# Patient Record
Sex: MALE
[De-identification: names, ages, dates, MRNs, and addresses within clinical notes are randomized per-mention and may not be internally consistent; named-entity substitution may affect disease eponyms.]

---

## 2019-12-04 PROBLEM — Z00.00 ENCOUNTER FOR PREVENTIVE HEALTH EXAMINATION: Status: ACTIVE | Noted: 2019-12-04

## 2019-12-10 ENCOUNTER — APPOINTMENT (OUTPATIENT)
Dept: ORTHOPEDIC SURGERY | Facility: CLINIC | Age: 74
End: 2019-12-10
Payer: MEDICARE

## 2019-12-10 VITALS
HEIGHT: 71 IN | SYSTOLIC BLOOD PRESSURE: 165 MMHG | WEIGHT: 204 LBS | DIASTOLIC BLOOD PRESSURE: 91 MMHG | HEART RATE: 80 BPM | BODY MASS INDEX: 28.56 KG/M2

## 2019-12-10 DIAGNOSIS — M70.61 TROCHANTERIC BURSITIS, RIGHT HIP: ICD-10-CM

## 2019-12-10 PROCEDURE — 99203 OFFICE O/P NEW LOW 30 MIN: CPT | Mod: 25

## 2019-12-10 PROCEDURE — 73522 X-RAY EXAM HIPS BI 3-4 VIEWS: CPT

## 2019-12-10 PROCEDURE — 20610 DRAIN/INJ JOINT/BURSA W/O US: CPT | Mod: RT

## 2019-12-10 NOTE — PHYSICAL EXAM
[de-identified] : Constitutional - the patient is of normal build and nutrition.  The patient remains oriented to person, time, and  place.  Mood is normal. Vital signs as recorded.  The patients gait is WNL.  He is tender over his greater trochanter on the right.  This clinically appears to be consistent with trochanteric bursitis.  The patient has satisfactory  balance and can stand on toes and heels.\par \par The patient has no difficulty with respiration. Respiration at rest is a normal rate. The patient is not short of breath and has not become short of breath with short ambulation. There is no audible wheezing. No coughing.\par \par Skin is normal for the patient's age. There are no abnormal masses or lymph nodes which stand out in the lower extremities.\par \par Spine - deep tendon reflexes are symmetric.  The type of pain he is having appears to be more trochanteric bursitis but he could have some radicular pain from his back also causing this discomfort.\par \par He is now getting over a gout and is left foot.\par \par \par UPPER EXTREMITIES \par \par Shoulders ROM  is symmetric  and the motion is satisfactory.  There is no significant shoulder pain or limitation in motion which would make using a cane or a walker difficult. Shoulder stability and  strength are satisfactory.\par \par Circulation appears satisfactory with pedal pulses present.  There is no major edema in the lower legs. No skin tenderness or increased temperature. No major varicosities.\par \par HIP EXAMINATION the abduction and abduction as well as rotation measurements were taken with the hip in flexion.\par \par Motion\par Hip flexion                               *[Right 135   Left 135]\par Hip abduction                         *[Right 70      Left 70]\par Hip adduction                         *[Right 35     Left 35]\par Hip external  Rotation             *[Right 65     Left 65]\par Hip Internal Rotation              *[Right 20      Left 20]\par Hip Extension                         *[Right 0        Left 0]\par \par The hips have good range of motion. There is good strength across the hips. There is no crepitus in either hip. The alignment of the hips are normal.  Exam is generally within normal limits.  He has excellent motion and the pain appears to be more consistent with trochanteric bursitis of his right hip.\par \par \par KNEE EXAMINATION\par \par Motion\par Right Knee has 0 to 135 degrees of motion with good medial  lateral and anterior posterior stability.  There is no major effusion.  There is no Baker's cyst.  There is no significant patellofemoral crepitus.  The patient has satisfactory strength across the knee.               \par Left  Knee   has 0 to 135 degrees of motion with good medial lateral and anterior posterior stability.  There is no major effusion there is no Baker's cyst.  There is no significant patellofemoral crepitus.  The patient has satisfactory strength across the knee.            \par \par Ankle and foot examination\par Of the ankle has normal motion.  There is normal ankle stability.  The patient has no major abnormalities of the foot.\par \par \par \par  [de-identified] : AP of the pelvis and lateral of his right and left hip shows femoral heads are round joint spaces maintained no significant evidence of arthritis no calcium over the greater trochanter but this does not rule out trochanteric bursitis.  The AP of the pelvis he can be seen that he has some degenerative changes L45.

## 2019-12-10 NOTE — DISCUSSION/SUMMARY
[de-identified] : Tolerated the local injection very well if his pain diminishes or is gone and it is a trochanteric bursitis of her persist he could be getting some radicular pain from his back and that could be further evaluated.

## 2019-12-10 NOTE — CONSULT LETTER
[FreeTextEntry1] : Dear Dr. Sotero Teran\par \par I had the pleasure of evaluating your patient in the office today. I am sending you a copy of my Orthopaedic consult note for your record. If you have any questions, please do not hesitate to contact my office.\par \par Sincerely,\par \par Mandeep Lomeli MD\par Chief, Adult Joint Reconstruction\par [facility]

## 2019-12-10 NOTE — HISTORY OF PRESENT ILLNESS
[de-identified] : Mr. KODY HOGAN is a 74 year male who presents to office complaining of right hip pain.\par Pain has been intermittent for about 20 years but worse insidiously over the last year.\par Pain starts on the lateral aspect of the hip and radiates into the groin. \par Pain is aggravated with physical activity such as getting up from a seated position and ambulating up and down stairs.\par Patient has not performed conservative treated for this pain, other than rest.\par Denies radiating pain down his leg, any lower back pain, distal neuropathy, or radiculopathy.\par Also denies hip clicking, locking, or instability.\par All review of systems, family history, social history, surgical history, past medical history, medications, and allergies not previously stated as positive are negative. They were reviewed by me today with the patient and documented accordingly.

## 2019-12-11 ENCOUNTER — MOBILE ON CALL (OUTPATIENT)
Age: 74
End: 2019-12-11

## 2021-05-08 NOTE — PROCEDURE
[de-identified] : Procedure Note: \par \par Anatomic Location: right  hip trochanteric bursitis\par \par Diagnosis:  hip trochanteric bursitis\par \par Procedure:  Injection of 6ccs of Marcaine 0.5% plain and Depo-Medrol 1cc (40 MG)\par \par Local Spray: Ethyl Chloride.\par \par Skin preparation with alcohol.\par \par Patient has consented for the procedure.\par \par Injection 22-gauge spinal needle  through an anterior  lateral approach.\par \par Patient tolerated the procedure well.\par \par Patient instructed to call the office if any reaction, fever, chills, increased erythema or swelling.   [phone]. Attending Only

## 2021-07-07 ENCOUNTER — APPOINTMENT (OUTPATIENT)
Dept: PULMONOLOGY | Facility: CLINIC | Age: 76
End: 2021-07-07
Payer: MEDICARE

## 2021-07-07 VITALS
HEIGHT: 71 IN | WEIGHT: 202 LBS | SYSTOLIC BLOOD PRESSURE: 155 MMHG | BODY MASS INDEX: 28.28 KG/M2 | DIASTOLIC BLOOD PRESSURE: 98 MMHG | OXYGEN SATURATION: 96 % | TEMPERATURE: 97.4 F | HEART RATE: 86 BPM

## 2021-07-07 PROCEDURE — 94726 PLETHYSMOGRAPHY LUNG VOLUMES: CPT

## 2021-07-07 PROCEDURE — 94729 DIFFUSING CAPACITY: CPT

## 2021-07-07 PROCEDURE — 94010 BREATHING CAPACITY TEST: CPT

## 2021-07-07 PROCEDURE — 99205 OFFICE O/P NEW HI 60 MIN: CPT | Mod: 25

## 2021-07-08 NOTE — PROCEDURE
[FreeTextEntry1] : 2021\par Pulmonary function testing\par These data demonstrate a moderate obstructive ventilatory deficit. Normal Lung Volumes. Airway resistance is normal. There is a moderate diffusion impairment. \par \par CAT scan of the chest of 2021 reviewed at [facility] radiology.\par \par Final Report\par CT CT Chest WO\par \par Show Printer-Friendly Version	\par Patient Name:	Tremaine Suarez\par :	1945\par ID:	[id number](DEFAULT)\par Study Date:	2021 10:29\par CT Chest WO\par  \par CLINICAL INDICATION: Hypertension\par .  Smoking history.\par TECHNIQUE: Multidetector-row CT images of the chest are obtained from \par the thoracic inlet through the upper abdomen without intravenous \par contrast. Coronal and sagittal reconstructions were performed. MIP \par images were reviewed. \par  \par COMPARISON:   No prior.\par  \par FINDINGS:\par 01. LUNGS, AIRWAYS: Trachea and bronchi are patent.\par Mild emphysema, upper lobe predominant.\par Diffuse bronchial wall thickening.\par Diffuse peripheral reticular opacities\par  \par 02. PLEURA: No pleural effusion.\par 03. HEART AND VESSELS: No pericardial effusion.  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  The ascending aorta measures 3.3 cm. \par Coronary artery calcifications.\par 04. MEDIASTINUM, SANDRA, LYMPH NODES: No mediastinal or hilar \par lymphadenopathy.\par 05. UPPER ABDOMEN: No acute abnormality.  Hepatic steatosis. \par Punctate bilateral upper pole renal calculi.\par 06. BONES AND SOFT TISSUES: No suspicious bony lesion.\par 07. LOWER NECK: Unremarkable thyroid.\par  \par IMPRESSION:\par Mild emphysema.\par Diffuse bronchial wall thickening.\par Diffuse peripheral reticular opacities consistent with interstitial \par lung disease.\par  \par Electronically signed by Xochilt Peña MD 2021 11:30 AM \par Ref. Physician: ROLANDA JOHNSON MD\par [address] [place] [address]\[place] [address]\par Signed by: Xochilt Peña    Signed on: 2021 11:33

## 2021-07-08 NOTE — HISTORY OF PRESENT ILLNESS
[Former] : former [TextBox_4] : KODY HOGAN JR is a 75 year old  M referred for pulmonary evaluation for abnormal CAT scan.\par \par Chronic SOB.\par States wheezing for past 30 years. Moderate cough. Positive mucous does not see. \par Family notes pursed lip breathing. MURRAY few blocks. Limited by hip. \par Had episode of acute SOB and trying to cough then had syncope.\par Out briefly.\par Not hospitalized.\par Saw cardiology had cardiac workup.\par Had CT Chest and abdominal sono. \par Had labs told OK except elevated cholesterol and creatinine. Has elevated U.A.\par \par Past pulmonary history. N\par Occupational Exposure.  and . Positive asbestos. Asthma as young adult. \par Family history of pulmonary disease. N\par Recent travel N\par Pets N\par \par Was vaccinated. [TextBox_11] : 1 [TextBox_13] : 60

## 2021-07-08 NOTE — DISCUSSION/SUMMARY
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  Also consideration to occupationally induced disease such as asbestosis.\par Less likely related to other etiologies such as idiopathic pulmonary fibrosis.

## 2021-07-08 NOTE — ASSESSMENT
[FreeTextEntry1] : Trial Stiolto.  Samples and instructed in proper use.\par PRN Beta Agonist.\par Yearly  CT. would recommend sooner if there is a change in respiratory status.\par Smoking cessation.  Urged and discussed.\par Follow-up in 6 months or sooner on a as needed basis.\par

## 2021-07-08 NOTE — PHYSICAL EXAM
[No Acute Distress] : no acute distress [Supple] : supple [No JVD] : no jvd [Normal S1, S2] : normal s1, s2 [No Murmurs] : no murmurs [Normal to Percussion] : normal to percussion [No Abnormalities] : no abnormalities [Benign] : benign [No HSM] : no hsm [No Clubbing] : no clubbing [No Cyanosis] : no cyanosis [No Edema] : no edema [TextBox_68] : Few to 1+ fine basilar crackles.  No active wheezing.

## 2021-07-08 NOTE — CONSULT LETTER
[Dear  ___] : Dear ~KRISTA, [Consult Letter:] : I had the pleasure of evaluating your patient, [unfilled]. [Consult Closing:] : Thank you very much for allowing me to participate in the care of this patient.  If you have any questions, please do not hesitate to contact me. [Sincerely,] : Sincerely, [FreeTextEntry2] : Sotero Teran MD\par  [FreeTextEntry3] : Christophe Onofre MD FCCP\par

## 2021-07-16 ENCOUNTER — APPOINTMENT (OUTPATIENT)
Dept: INTERNAL MEDICINE | Facility: CLINIC | Age: 76
End: 2021-07-16

## 2022-01-12 ENCOUNTER — APPOINTMENT (OUTPATIENT)
Dept: PULMONOLOGY | Facility: CLINIC | Age: 77
End: 2022-01-12

## 2022-02-16 ENCOUNTER — APPOINTMENT (OUTPATIENT)
Dept: PULMONOLOGY | Facility: CLINIC | Age: 77
End: 2022-02-16
Payer: MEDICARE

## 2022-02-16 DIAGNOSIS — R06.02 SHORTNESS OF BREATH: ICD-10-CM

## 2022-02-16 PROCEDURE — 99213 OFFICE O/P EST LOW 20 MIN: CPT

## 2022-02-16 PROCEDURE — ZZZZZ: CPT

## 2022-02-17 NOTE — HISTORY OF PRESENT ILLNESS
[Former] : former [TextBox_4] : has been using Stiolto PRN\par did not have script so was using samples sparingly\par never got albuterol either\par still smoking cut down to less than 1 PPD\par has some cough and wheezing PRN, some mucus prn [TextBox_11] : 1 [TextBox_13] : 60

## 2022-02-17 NOTE — ASSESSMENT
[FreeTextEntry1] : Stiolto continue urge compliance. \par PRN Beta Agonist.\par Yearly  CT. would recommend sooner if there is a change in respiratory status.  Due June.\par Smoking cessation.  Urged and discussed.\par Follow-up in 6 months or sooner on a as needed basis.\par

## 2022-02-17 NOTE — PHYSICAL EXAM
[No Acute Distress] : no acute distress [Supple] : supple [No JVD] : no jvd [Normal S1, S2] : normal s1, s2 [No Murmurs] : no murmurs [Normal to Percussion] : normal to percussion [No Abnormalities] : no abnormalities [Benign] : benign [No HSM] : no hsm [No Clubbing] : no clubbing [No Cyanosis] : no cyanosis [No Edema] : no edema [TextBox_68] : Few fine basilar crackles.  No active wheezing.

## 2022-02-17 NOTE — PROCEDURE
[FreeTextEntry1] : 2021\par Pulmonary function testing\par These data demonstrate a moderate obstructive ventilatory deficit. Normal Lung Volumes. Airway resistance is normal. There is a moderate diffusion impairment. \par \par CAT scan of the chest of 2021 reviewed at [facility] radiology.\par \par Final Report\par CT CT Chest WO\par \par Show Printer-Friendly Version	\par Patient Name:	Tremaine Suarez\par :	1945\par ID:	[id number](DEFAULT)\par Study Date:	2021 10:29\par CT Chest WO\par  \par CLINICAL INDICATION: Hypertension\par .  Smoking history.\par TECHNIQUE: Multidetector-row CT images of the chest are obtained from \par the thoracic inlet through the upper abdomen without intravenous \par contrast. Coronal and sagittal reconstructions were performed. MIP \par images were reviewed. \par  \par COMPARISON:   No prior.\par  \par FINDINGS:\par 01. LUNGS, AIRWAYS: Trachea and bronchi are patent.\par Mild emphysema, upper lobe predominant.\par Diffuse bronchial wall thickening.\par Diffuse peripheral reticular opacities\par  \par 02. PLEURA: No pleural effusion.\par 03. HEART AND VESSELS: No pericardial effusion.  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  The ascending aorta measures 3.3 cm. \par Coronary artery calcifications.\par 04. MEDIASTINUM, SANDRA, LYMPH NODES: No mediastinal or hilar \par lymphadenopathy.\par 05. UPPER ABDOMEN: No acute abnormality.  Hepatic steatosis. \par Punctate bilateral upper pole renal calculi.\par 06. BONES AND SOFT TISSUES: No suspicious bony lesion.\par 07. LOWER NECK: Unremarkable thyroid.\par  \par IMPRESSION:\par Mild emphysema.\par Diffuse bronchial wall thickening.\par Diffuse peripheral reticular opacities consistent with interstitial \par lung disease.\par  \par Electronically signed by Xochilt Peña MD 2021 11:30 AM \par Ref. Physician: ROLANDA JOHNSON MD\par [address] [place] [address]\[place] [address]\par Signed by: Xochilt Peña    Signed on: 2021 11:33

## 2022-07-27 RX ORDER — ALBUTEROL SULFATE 90 UG/1
108 (90 BASE) INHALANT RESPIRATORY (INHALATION)
Qty: 3 | Refills: 3 | Status: ACTIVE | COMMUNITY
Start: 2022-02-16 | End: 1900-01-01

## 2022-09-28 ENCOUNTER — APPOINTMENT (OUTPATIENT)
Dept: PULMONOLOGY | Facility: CLINIC | Age: 77
End: 2022-09-28

## 2024-03-06 ENCOUNTER — APPOINTMENT (OUTPATIENT)
Dept: PULMONOLOGY | Facility: CLINIC | Age: 79
End: 2024-03-06
Payer: MEDICARE

## 2024-03-06 VITALS
SYSTOLIC BLOOD PRESSURE: 155 MMHG | HEART RATE: 86 BPM | BODY MASS INDEX: 28.14 KG/M2 | HEIGHT: 71 IN | TEMPERATURE: 97.5 F | OXYGEN SATURATION: 99 % | DIASTOLIC BLOOD PRESSURE: 63 MMHG | WEIGHT: 201 LBS

## 2024-03-06 DIAGNOSIS — J06.9 ACUTE UPPER RESPIRATORY INFECTION, UNSPECIFIED: ICD-10-CM

## 2024-03-06 DIAGNOSIS — R91.8 OTHER NONSPECIFIC ABNORMAL FINDING OF LUNG FIELD: ICD-10-CM

## 2024-03-06 DIAGNOSIS — F17.200 NICOTINE DEPENDENCE, UNSPECIFIED, UNCOMPLICATED: ICD-10-CM

## 2024-03-06 DIAGNOSIS — J44.9 CHRONIC OBSTRUCTIVE PULMONARY DISEASE, UNSPECIFIED: ICD-10-CM

## 2024-03-06 DIAGNOSIS — R05.9 COUGH, UNSPECIFIED: ICD-10-CM

## 2024-03-06 PROCEDURE — 99214 OFFICE O/P EST MOD 30 MIN: CPT

## 2024-03-06 PROCEDURE — 71046 X-RAY EXAM CHEST 2 VIEWS: CPT

## 2024-03-06 RX ORDER — COLCHICINE 0.6 MG/1
0.6 TABLET ORAL
Refills: 0 | Status: ACTIVE | COMMUNITY

## 2024-03-06 RX ORDER — ALBUTEROL SULFATE 90 UG/1
108 (90 BASE) INHALANT RESPIRATORY (INHALATION)
Qty: 1 | Refills: 5 | Status: DISCONTINUED | COMMUNITY
Start: 2021-07-07 | End: 2024-03-06

## 2024-03-06 RX ORDER — ALLOPURINOL 200 MG/1
TABLET ORAL
Refills: 0 | Status: ACTIVE | COMMUNITY

## 2024-03-06 RX ORDER — ATORVASTATIN CALCIUM 20 MG/1
20 TABLET, FILM COATED ORAL
Refills: 0 | Status: ACTIVE | COMMUNITY

## 2024-03-06 RX ORDER — TIOTROPIUM BROMIDE AND OLODATEROL 3.124; 2.736 UG/1; UG/1
2.5-2.5 SPRAY, METERED RESPIRATORY (INHALATION)
Qty: 1 | Refills: 5 | Status: ACTIVE | COMMUNITY
Start: 2021-12-08 | End: 1900-01-01

## 2024-03-06 RX ORDER — PREDNISONE 10 MG/1
10 TABLET ORAL
Qty: 30 | Refills: 0 | Status: ACTIVE | COMMUNITY
Start: 2024-03-06 | End: 1900-01-01

## 2024-03-06 NOTE — ASSESSMENT
[FreeTextEntry1] : Couse of Prednisone Complete course of Augmentin.  cont Stiolto.  PRN Beta Agonist. Urge to go for CT. CT lung cancer screening. Recommend patient return for reevaluation and lung function testing when improved. Smoking cessation.  Urged and discussed.

## 2024-03-06 NOTE — PROCEDURE
[FreeTextEntry1] :    2021 Pulmonary function testing These data demonstrate a moderate obstructive ventilatory deficit. Normal Lung Volumes. Airway resistance is normal. There is a moderate diffusion impairment.   CAT scan of the chest of 2021 reviewed at [facility] radiology.  Final Report CT CT Chest WO  Show Printer-Friendly Version	 Patient Name:	Tremaine Suarez :	1945 ID:	[id number](DEFAULT) Study Date:	2021 10:29 CT Chest WO   CLINICAL INDICATION: Hypertension .  Smoking history. TECHNIQUE: Multidetector-row CT images of the chest are obtained from  the thoracic inlet through the upper abdomen without intravenous  contrast. Coronal and sagittal reconstructions were performed. MIP  images were reviewed.    COMPARISON:   No prior.   FINDINGS: 01. LUNGS, AIRWAYS: Trachea and bronchi are patent. Mild emphysema, upper lobe predominant. Diffuse bronchial wall thickening. Diffuse peripheral reticular opacities   02. PLEURA: No pleural effusion. 03. HEART AND VESSELS: No pericardial effusion.  There is aberrant  right subclavian artery.  The ascending aorta measures 3.3 cm.  Coronary artery calcifications. 04. MEDIASTINUM, SANDRA, LYMPH NODES: No mediastinal or hilar  lymphadenopathy. 05. UPPER ABDOMEN: No acute abnormality.  Hepatic steatosis.  Punctate bilateral upper pole renal calculi. 06. BONES AND SOFT TISSUES: No suspicious bony lesion. 07. LOWER NECK: Unremarkable thyroid.   IMPRESSION: Mild emphysema. Diffuse bronchial wall thickening. Diffuse peripheral reticular opacities consistent with interstitial  lung disease.   Electronically signed by Xochilt Peña MD 2021 11:30 AM  Ref. Physician: ROLANDA JOHNSON  [address] [place] [address] Signed by: Xochilt Peña    Signed on: 2021 11:33

## 2024-03-06 NOTE — DISCUSSION/SUMMARY
[FreeTextEntry1] : COPD exacerbation. Current every day smoker. Moderate chronic obstructive pulmonary disease. 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  Also consideration to occupationally induced disease such as asbestosis. Less likely related to other etiologies such as idiopathic pulmonary fibrosis.

## 2024-03-06 NOTE — PHYSICAL EXAM
[No Acute Distress] : no acute distress [Supple] : supple [No JVD] : no jvd [Normal S1, S2] : normal s1, s2 [No Murmurs] : no murmurs [Normal to Percussion] : normal to percussion [No Abnormalities] : no abnormalities [Benign] : benign [No HSM] : no hsm [No Clubbing] : no clubbing [No Cyanosis] : no cyanosis [No Edema] : no edema [TextBox_68] : Few fine basilar crackles.  1+ intermittent wheezing.

## 2024-03-06 NOTE — HISTORY OF PRESENT ILLNESS
[Current] : current [TextBox_11] : 1 [TextBox_4] : has been using Stiolto regularly [facility] and [facility] Saturday and placed on Augmentin 875 bid ,covid and flu neg maybe slight improvement but pt feels zpak helps better. Has cough and mucus. Chils resolved on abx. No fever. Malaise improved.  Congestion is in chest and sinus.  daughter notices father has more sob with exertion in general.  never got albuterol either still smoking 1 PPD Does have chronic cough.  Last CT 2021. [TextBox_13] : 60

## 2024-03-06 NOTE — CONSULT LETTER
[Dear  ___] : Dear ~KRISTA, [Consult Letter:] : I had the pleasure of evaluating your patient, [unfilled]. [Consult Closing:] : Thank you very much for allowing me to participate in the care of this patient.  If you have any questions, please do not hesitate to contact me. [Sincerely,] : Sincerely, [FreeTextEntry3] : Christophe Onofre MD FCCP\par   [FreeTextEntry2] : Sotero Teran MD\par

## 2024-04-19 ENCOUNTER — NON-APPOINTMENT (OUTPATIENT)
Age: 79
End: 2024-04-19

## 2024-04-30 ENCOUNTER — NON-APPOINTMENT (OUTPATIENT)
Age: 79
End: 2024-04-30

## 2024-11-20 ENCOUNTER — APPOINTMENT (OUTPATIENT)
Dept: CARDIOLOGY | Facility: CLINIC | Age: 79
End: 2024-11-20

## 2024-11-20 ENCOUNTER — NON-APPOINTMENT (OUTPATIENT)
Age: 79
End: 2024-11-20

## 2024-11-20 VITALS
DIASTOLIC BLOOD PRESSURE: 75 MMHG | HEIGHT: 71 IN | OXYGEN SATURATION: 98 % | BODY MASS INDEX: 28.7 KG/M2 | HEART RATE: 95 BPM | SYSTOLIC BLOOD PRESSURE: 111 MMHG | WEIGHT: 205 LBS

## 2024-11-20 DIAGNOSIS — E78.5 HYPERLIPIDEMIA, UNSPECIFIED: ICD-10-CM

## 2024-11-20 DIAGNOSIS — Z82.3 FAMILY HISTORY OF STROKE: ICD-10-CM

## 2024-11-20 DIAGNOSIS — Z78.9 OTHER SPECIFIED HEALTH STATUS: ICD-10-CM

## 2024-11-20 DIAGNOSIS — Z72.0 TOBACCO USE: ICD-10-CM

## 2024-11-20 DIAGNOSIS — M1A.09X0 IDIOPATHIC CHRONIC GOUT, MULTIPLE SITES, W/OUT TOPHUS (TOPHI): ICD-10-CM

## 2024-11-20 DIAGNOSIS — R94.39 ABNORMAL RESULT OF OTHER CARDIOVASCULAR FUNCTION STUDY: ICD-10-CM

## 2024-11-20 DIAGNOSIS — I10 ESSENTIAL (PRIMARY) HYPERTENSION: ICD-10-CM

## 2024-11-20 PROCEDURE — 93000 ELECTROCARDIOGRAM COMPLETE: CPT

## 2024-11-20 PROCEDURE — 99205 OFFICE O/P NEW HI 60 MIN: CPT

## 2024-11-20 PROCEDURE — G2211 COMPLEX E/M VISIT ADD ON: CPT

## 2024-11-20 RX ORDER — AMLODIPINE BESYLATE 5 MG/1
5 TABLET ORAL
Qty: 90 | Refills: 2 | Status: ACTIVE | COMMUNITY